# Patient Record
Sex: FEMALE | Race: WHITE
[De-identification: names, ages, dates, MRNs, and addresses within clinical notes are randomized per-mention and may not be internally consistent; named-entity substitution may affect disease eponyms.]

---
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
## 2025-01-03 NOTE — ED GENERAL
General


Chief Complaint:  COVID19 Suspect/Confirmed


Stated Complaint:  BODY ACHES,CONGESTION,COUGH


Nursing Triage Note:  


yesterday started having body aches, took tylenol and gained some relief, also 


heating pad for comfort. c/o cough and congestion. feeling worn out and tired


Source of Information:  Patient


Exam Limitations:  No Limitations





History of Present Illness


Date Seen by Provider:  Dec 1, 2021


Time Seen by Provider:  10:31


Initial Comments


Here with body aches, cough and congestion as well as malaise.  Onset yesterday 

but did have nausea 2 or 3 days before that.  She is vaccinated for Covid with 

Pfizer.  She is a student.  Denies nausea, vomiting or diarrhea currently.  

Denies dysuria.  Her last menstrual cycle lasted only 2 days and mid to late 

November.  She is on birth control and is sexually active.  Does not believe 

that she could be pregnant.  She did take Tylenol this morning and that did help

some.


Timing/Duration:  1-2 Days, Getting Worse


Severity:  Moderate


Associated Systoms:  Cough, Malaise, Shortness of Air





Allergies and Home Medications


Allergies


Coded Allergies:  


     No Known Drug Allergies (Unverified , 12/1/21)





Patient Home Medication List


Home Medication List Reviewed:  Yes





Review of Systems


Review of Systems


Constitutional:  see HPI; No chills, No weakness


EENTM:  nose congestion; No throat pain


Respiratory:  cough, short of breath


Cardiovascular:  no symptoms reported


Gastrointestinal:  see HPI


Genitourinary:  No dysuria, No frequency


LMP:  Nov 17, 2021


Musculoskeletal:  joint pain, muscle pain


Skin:  no symptoms reported





All Other Systems Reviewed


Negative Unless Noted:  Yes





Past Medical-Social-Family Hx


Patient Social History


Tobacco Use?:  No


Use of E-Cig and/or Vaping dev:  No


Substance use?:  No


Alcohol Use?:  No


Pt feels they are or have been:  No





Immunizations Up To Date


Influenza Vaccine Up-to-Date:  No; Not Current


First/Initial COVID19 Vaccinat:  may 2020


Second COVID19 Vaccination Jim:  JUNE 2020


COVID19 Vaccine :  MODERNA





Past Medical History


Surgeries:  Yes


Orthopedic


Respiratory:  Yes


Asthma


Cardiac:  No


Last Menstrual Period:  Nov 7, 2021


Psychosocial:  Yes


Anxiety, Depression





Family Medical History


Reviewed and Corrections made


No Pertinent Family Hx





Physical Exam


Vital Signs





Vital Signs - First Documented








 12/1/21





 10:08


 


Temp 36.4


 


Pulse 104


 


Resp 18


 


B/P (MAP) 139/92 (108)


 


Pulse Ox 98


 


O2 Delivery Room Air





Capillary Refill : Less Than 3 Seconds


Height, Weight, BMI


Height: '"


Weight: lbs. oz. kg; 34.00 BMI


Method:


General Appearance:  No Apparent Distress, WD/WN


HEENT:  PERRL/EOMI, Pharynx Normal


Neck:  Non Tender, Supple


Respiratory:  Lungs Clear, Normal Breath Sounds


Cardiovascular:  No Murmur, Tachycardia


Gastrointestinal:  Non Tender, Soft


Back:  Normal Inspection, No CVA Tenderness, No Vertebral Tenderness


Extremity:  Normal Range of Motion, Non Tender


Neurologic/Psychiatric:  Alert, Oriented x3


Skin:  Normal Color, Warm/Dry





Progress/Results/Core Measures


Suspected Sepsis


SIRS


Temperature: 


Pulse: 104 


Respiratory Rate: 18


 


Blood Pressure 139 /92 


Mean: 108





Results/Orders


Lab Results





Laboratory Tests








Test


 12/1/21


10:05 12/1/21


11:00 Range/Units


 


 


Influenza Type A (RT-PCR) Not Detected   Not Detecte  


 


Influenza Type B (RT-PCR) Not Detected   Not Detecte  


 


SARS-CoV-2 RNA (RT-PCR) Not Detected   Not Detecte  


 


Urine Color  YELLOW   


 


Urine Clarity  CLEAR   


 


Urine pH  8.0  5-9  


 


Urine Specific Gravity  1.010 L 1.016-1.022  


 


Urine Protein  NEGATIVE  NEGATIVE  


 


Urine Glucose (UA)  NEGATIVE  NEGATIVE  


 


Urine Ketones  NEGATIVE  NEGATIVE  


 


Urine Nitrite  NEGATIVE  NEGATIVE  


 


Urine Bilirubin  NEGATIVE  NEGATIVE  


 


Urine Urobilinogen  0.2  < = 1.0  MG/DL


 


Urine Leukocyte Esterase  NEGATIVE  NEGATIVE  


 


Urine RBC (Auto)  NEGATIVE  NEGATIVE  


 


Urine RBC  0-2   /HPF


 


Urine WBC  0-2   /HPF


 


Urine Squamous Epithelial


Cells 


 0-2 


  /HPF





 


Urine Crystals  NONE   /LPF


 


Urine Bacteria  TRACE   /HPF


 


Urine Casts  NONE   /LPF


 


Urine Mucus  NEGATIVE   /LPF


 


Urine Culture Indicated  NO   








My Orders





Orders - RICKY ACOSTA MD


Influenza A And B By Pcr (12/1/21 10:30)


Covid 19 Inhouse Test (12/1/21 10:30)


Ua Culture If Indicated (12/1/21 10:55)


Urine Pregnancy Bedside (12/1/21 10:55)





Vital Signs/I&O











 12/1/21





 10:08


 


Temp 36.4


 


Pulse 104


 


Resp 18


 


B/P (MAP) 139/92 (108)


 


Pulse Ox 98


 


O2 Delivery Room Air





Capillary Refill : Less Than 3 Seconds








Blood Pressure Mean:                    108








Progress Note :  


Progress Note


Seen and evaluated.  Influenza and COVID-19 screening test ordered.  UA and beds

nacho UCG ordered.  Heart rate 103 to 120s but improves while resting.  Monitor 

patient.  1140: UA, Covid and influenza screen negative.  UCG negative.  Overall

reassured.  Discharged home with return precautions.  Patient verbalized 

understanding instructions and agreement with plan.





Departure


Impression





   Primary Impression:  


   Viral syndrome


Disposition:  01 HOME, SELF-CARE


Condition:  Stable





Departure-Patient Inst.


Decision time for Depature:  11:41


Referrals:  


NO,LOCAL PHYSICIAN (PCP/Family)


Primary Care Physician


Patient Instructions:  Viral Syndrome (DC)





Add. Discharge Instructions:  








All discharge instructions reviewed with patient and/or family. Voiced 

understanding.





You may take Tylenol/acetaminophen 1000 mg every 8 hours as needed for pain.  

You may take ibuprofen 600 mg every 8 hours as needed for pain.  Drink plenty of

fluids by taking small sips frequently.  Eat a diet as tolerated.  You need to 

rest for a few days.  Follow-up with your doctor or the [facility] 

later this week or early next week for recheck and further evaluation as needed.

 Return for worse pain, fever, vomiting, weakness, breathing problems or other 

concerns as needed.





Copy


Copies To 1:   LAUREN CORONADO MD, TIMOTHY D MD           Dec 1, 2021 11:16 Medication:    Disp Refills Start End    Tirzepatide-Weight Management (Zepbound) 5 MG/0.5ML Solution Auto-injector         passed protocol.   Last office visit date: 11/08/2024  Next appointment scheduled?: Yes   Number of refills given: 0